# Patient Record
Sex: FEMALE | Race: WHITE | NOT HISPANIC OR LATINO | URBAN - NONMETROPOLITAN AREA
[De-identification: names, ages, dates, MRNs, and addresses within clinical notes are randomized per-mention and may not be internally consistent; named-entity substitution may affect disease eponyms.]

---

## 2018-09-11 PROBLEM — H25.813: Noted: 2018-09-11

## 2018-09-11 PROBLEM — H16.141: Noted: 2021-07-01

## 2018-09-11 PROBLEM — H16.141: Noted: 2021-08-21

## 2018-09-11 PROBLEM — H35.3131: Noted: 2021-07-01

## 2018-09-11 PROBLEM — H02.112: Noted: 2021-07-01

## 2018-09-11 PROBLEM — D31.32: Noted: 2021-07-01

## 2018-09-11 PROBLEM — H52.4: Noted: 2021-07-01

## 2018-09-11 PROBLEM — H02.112: Noted: 2021-08-21

## 2021-07-01 ENCOUNTER — IMPORTED ENCOUNTER (OUTPATIENT)
Dept: URBAN - NONMETROPOLITAN AREA CLINIC 1 | Facility: CLINIC | Age: 86
End: 2021-07-01

## 2021-07-01 PROCEDURE — 92134 CPTRZ OPH DX IMG PST SGM RTA: CPT

## 2021-07-01 PROCEDURE — 99204 OFFICE O/P NEW MOD 45 MIN: CPT

## 2021-07-01 PROCEDURE — 92015 DETERMINE REFRACTIVE STATE: CPT

## 2021-07-01 NOTE — PATIENT DISCUSSION
Cataract(s)-Visually significant.-Cataract(s) causing symptomatic impairment of visual function not correctable with a tolerable change in glasses or contact lenses lighting or non-operative means resulting in specific activity limitations and/or participation restrictions including but not limited to reading viewing television driving or meeting vocational or recreational needs. -Expectation is clearer vision and reduced glare disability after cataract removal.-Refer to Dr Jasmyne Lopez for cataract evaluations/p Chalazion RLL x2 excision 8/23/18 2' ectropion-RLL thickened with secondary ectropion; needs ocuplastics consult before considering cataract surgeryAMD OUBaseline MAC OCT today; monitor. Minimal risk for conversion to wet AMD.Presbyopia-Discussed diagnosis with patient. Updated spec Rx given but minimal improvement in vision; needs cat surgery

## 2021-08-21 ENCOUNTER — IMPORTED ENCOUNTER (OUTPATIENT)
Dept: URBAN - NONMETROPOLITAN AREA CLINIC 1 | Facility: CLINIC | Age: 86
End: 2021-08-21

## 2021-08-21 PROCEDURE — 92014 COMPRE OPH EXAM EST PT 1/>: CPT

## 2021-08-21 NOTE — PATIENT DISCUSSION
Cataract(s)-Visually significant cataract OD . -Cataract(s) causing symptomatic impairment of visual function not correctable with a tolerable change in glasses or contact lenses lighting or non-operative means resulting in specific activity limitations and/or participation restrictions including but not limited to reading viewing television driving or meeting vocational or recreational needs. -Expectation is clearer vision and functional improvement in symptoms as well as reduced glare disability after cataract removal.-Order IOLMaster and OPD today. -Recommend Standard Trad IOL OU  based on today's OPD testing and lifestyle questionnaire.-All questions were answered regarding surgery including pre and post-op medications appointments activity restrictions and anesthetic usage.-The risks benefits and alternatives and special risk factors for the patient were discussed in detail including but not limited to: bleeding infection retinal detachment vitreous loss problems with the implant and possible need for additional surgery.-Although rare the possibility of complete vision loss was discussed.-The possible need for glasses post-operatively was discussed.-Order medical clearance exam based on history of -Patient elects to proceed with cataract surgery OD . Will schedule at patient's convenience and re-evaluate OS  in the future. Pt wishes to proceed with STandard Trad IOL OD then OS Ectropion-Ectropion (the lower eyelid rolling outward causing lashes to rub against the eye) was explained to the patient. .-feels that it looks like a basil cell and needs to have a biopsy.-This can result in excessive tearing irritation infection scarring and loss of vision.-Treatment options include observation or surgical correction.-Risks and benefits of ectropion repair were discussed and pt elects to proceed.  Reccomend to send pt to [facility]. for treatment RLL

## 2021-08-23 ENCOUNTER — IMPORTED ENCOUNTER (OUTPATIENT)
Dept: URBAN - NONMETROPOLITAN AREA CLINIC 1 | Facility: CLINIC | Age: 86
End: 2021-08-23

## 2021-08-23 PROBLEM — Z01.818: Noted: 2021-08-23

## 2021-08-23 PROBLEM — I10: Noted: 2021-08-23

## 2021-08-23 PROBLEM — H02.112: Noted: 2021-08-23

## 2021-08-23 PROBLEM — H25.813: Noted: 2021-08-23

## 2021-08-23 PROBLEM — H16.141: Noted: 2021-08-23

## 2021-08-23 PROBLEM — D31.32: Noted: 2021-08-23

## 2021-08-31 ENCOUNTER — IMPORTED ENCOUNTER (OUTPATIENT)
Dept: URBAN - NONMETROPOLITAN AREA CLINIC 1 | Facility: CLINIC | Age: 86
End: 2021-08-31

## 2021-08-31 PROBLEM — Z96.1: Noted: 2021-08-31

## 2021-08-31 PROBLEM — H25.812: Noted: 2021-08-31

## 2021-08-31 PROBLEM — I10: Noted: 2021-08-23

## 2021-08-31 PROCEDURE — 99024 POSTOP FOLLOW-UP VISIT: CPT

## 2021-08-31 NOTE — PATIENT DISCUSSION
"""s/p PCIOL-Pt doing well s/p PCIOL. -Continue post-op gtts according to instruction sheet and sleep with eye shield over eye for 7 nights.-Avoid bending at the waist lifting anything over 5lbs and dirty or maco environments. -RTC 1 week POV 9/9/21Cataract OS-Keep appt for re-eval OS 9/9/21"

## 2021-09-09 ENCOUNTER — IMPORTED ENCOUNTER (OUTPATIENT)
Dept: URBAN - NONMETROPOLITAN AREA CLINIC 1 | Facility: CLINIC | Age: 86
End: 2021-09-09

## 2021-09-09 PROBLEM — Z96.1: Noted: 2021-09-09

## 2021-09-09 PROBLEM — Z98.42: Noted: 2021-09-14

## 2021-09-09 PROBLEM — H25.812: Noted: 2021-09-09

## 2021-09-09 PROBLEM — Z01.818: Noted: 2021-09-09

## 2021-09-09 PROBLEM — H35.3131: Noted: 2021-09-20

## 2021-09-09 PROBLEM — I10: Noted: 2021-09-09

## 2021-09-09 NOTE — PATIENT DISCUSSION
Cataract(s)-Visually significant cataract OS . -Cataract(s) causing symptomatic impairment of visual function not correctable with a tolerable change in glasses or contact lenses lighting or non-operative means resulting in specific activity limitations and/or participation restrictions including but not limited to reading viewing television driving or meeting vocational or recreational needs. -Expectation is clearer vision and functional improvement in symptoms as well as reduced glare disability after cataract removal.-Recommend Standard Trad IOL  based on previous OPD testing and lifestyle questionnaire.-All questions were answered regarding surgery including pre and post-op medications appointments activity restrictions and anesthetic usage.-The risks benefits and alternatives and special risk factors for the patient were discussed in detail including but not limited to: bleeding infection retinal detachment vitreous loss problems with the implant and possible need for additional surgery.-Although rare the possibility of complete vision loss was discussed.-The need for glasses post-operatively was discussed.-Patient elects to proceed with cataract surgery OS . Standard Trad IOL OSs/p PCIOL-Pt doing well at 1 week s/p PCIOL. -Continue post-op gtts according to instruction sheet.-Okay to resume usual activites and d/c eye shield.

## 2021-09-14 ENCOUNTER — IMPORTED ENCOUNTER (OUTPATIENT)
Dept: URBAN - NONMETROPOLITAN AREA CLINIC 1 | Facility: CLINIC | Age: 86
End: 2021-09-14

## 2021-09-14 PROBLEM — H43.813: Noted: 2021-10-05

## 2021-09-14 PROBLEM — Z96.1: Noted: 2021-10-05

## 2021-09-14 PROBLEM — H02.112: Noted: 2021-10-05

## 2021-09-14 PROBLEM — Z98.42: Noted: 2021-09-14

## 2021-09-14 PROBLEM — H35.3131: Noted: 2021-10-05

## 2021-09-14 PROCEDURE — 99024 POSTOP FOLLOW-UP VISIT: CPT

## 2021-09-14 NOTE — PATIENT DISCUSSION
s/p PCIOL-Pt doing well s/p PCIOL. -Continue post-op gtts according to instruction sheet and sleep with eye shield over eye for 7 nights.-Avoid bending at the waist lifting anything over 5lbs and dirty or maco environments. -START Alhaji QID OS -IOP 34 OS 2 gtt timoptic pf instilled in office OS

## 2021-09-20 ENCOUNTER — IMPORTED ENCOUNTER (OUTPATIENT)
Dept: URBAN - NONMETROPOLITAN AREA CLINIC 1 | Facility: CLINIC | Age: 86
End: 2021-09-20

## 2021-09-20 PROCEDURE — 99024 POSTOP FOLLOW-UP VISIT: CPT

## 2021-09-20 NOTE — PATIENT DISCUSSION
s/p PCIOL-Pt doing well at 1 week s/p PCIOL. -Continue post-op gtts according to instruction sheet.-Okay to resume usual activites and d/c eye shield. -Recheck in 2 weeks with refraction

## 2021-10-05 ENCOUNTER — IMPORTED ENCOUNTER (OUTPATIENT)
Dept: URBAN - NONMETROPOLITAN AREA CLINIC 1 | Facility: CLINIC | Age: 86
End: 2021-10-05

## 2021-10-05 PROCEDURE — 99024 POSTOP FOLLOW-UP VISIT: CPT

## 2021-10-05 NOTE — PATIENT DISCUSSION
s/p PCIOL-Stable PCIOL OU.-Monitor for PCO. -New Rx dispensed today Ectropion RLLwill refer to Dr. Colette Quarles due to transportation

## 2021-11-02 ENCOUNTER — IMPORTED ENCOUNTER (OUTPATIENT)
Dept: URBAN - NONMETROPOLITAN AREA CLINIC 1 | Facility: CLINIC | Age: 86
End: 2021-11-02

## 2021-11-02 PROBLEM — H02.112: Noted: 2021-10-05

## 2021-11-02 PROBLEM — H35.3131: Noted: 2021-11-02

## 2021-11-02 PROBLEM — H10.401: Noted: 2021-11-02

## 2021-11-02 PROBLEM — H43.813: Noted: 2021-10-05

## 2021-11-02 PROBLEM — Z96.1: Noted: 2021-11-02

## 2021-11-02 PROCEDURE — 92134 CPTRZ OPH DX IMG PST SGM RTA: CPT

## 2021-11-02 PROCEDURE — 99214 OFFICE O/P EST MOD 30 MIN: CPT

## 2021-11-02 NOTE — PATIENT DISCUSSION
Chronic conj ODSTART Tobradex QID OD X10 days until seen sent to [facility] will recheck in 10 days Ectropion RLL Keep sched appt w/Gwendolyn  (12/6/21)Suspect basal cell component given ulcerated appearanceAMD - dry-Explained dry AMD and advised that there are no treatments available at this time.-Continue AREDS 2 MVT. -MAC OCT ordered today no evidence of SRF/IRF at this time

## 2021-11-12 ENCOUNTER — IMPORTED ENCOUNTER (OUTPATIENT)
Dept: URBAN - NONMETROPOLITAN AREA CLINIC 1 | Facility: CLINIC | Age: 86
End: 2021-11-12

## 2021-11-12 PROCEDURE — 99213 OFFICE O/P EST LOW 20 MIN: CPT

## 2021-11-12 NOTE — PATIENT DISCUSSION
Chronic conj ODContinue Tobradex BID OD x 2 weeks then stop. Ectropion RLL Keep sched appt w/Gwendolyn  (12/6/21)Suspect basal cell component given ulcerated appearanceAMD - dry-Explained dry AMD and advised that there are no treatments available at this time.-Continue AREDS 2 MVT. PC IOL-Doing well monitorPVD OU-Chronic monitor.

## 2022-04-15 ASSESSMENT — VISUAL ACUITY
OS_CC: 20/200
OD_PH: 20/50
OD_CC: 20/60
OS_SC: 20/100
OS_GLARE: CF 2'
OS_PH: 20/60-1
OS_CC: 20/80
OD_SC: 20/60
OS_GLARE: CF 2'
OS_PH: 20/70+
OS_SC: 20/70+2
OD_GLARE: CF 2'
OD_PH: 20/60+3
OS_AM: 20/60
OD_PAM: 20/30
OD_CC: 20/70
OD_SC: 20/50-1
OS_CC: 20/200
OD_CC: 20/100
OS_PH: 20/80
OS_CC: 20/200
OD_CC: 20/60+2
OS_AM: 20/60
OD_PH: 20/40
OD_CC: 20/40
OS_SC: 20/100+2
OS_CC: 20/200
OD_PH: 20/60+2
OS_CC: 20/200
OD_SC: 20/40
OD_PH: 20/30-2
OD_CC: 20/60+2
OS_SC: 20/80-1

## 2022-04-15 ASSESSMENT — TONOMETRY
OS_IOP_MMHG: 19
OD_IOP_MMHG: 14
OD_IOP_MMHG: 18
OS_IOP_MMHG: 12
OS_IOP_MMHG: 19
OD_IOP_MMHG: 19
OD_IOP_MMHG: 31
OD_IOP_MMHG: 12
OD_IOP_MMHG: 13
OD_IOP_MMHG: 19
OS_IOP_MMHG: 14
OS_IOP_MMHG: 34
OS_IOP_MMHG: 19
OS_IOP_MMHG: 13
OD_IOP_MMHG: 12

## 2023-05-08 ENCOUNTER — FOLLOW UP (OUTPATIENT)
Dept: RURAL CLINIC 2 | Facility: CLINIC | Age: 88
End: 2023-05-08

## 2023-05-08 DIAGNOSIS — Z96.1: ICD-10-CM

## 2023-05-08 DIAGNOSIS — H10.401: ICD-10-CM

## 2023-05-08 DIAGNOSIS — Z98.890: ICD-10-CM

## 2023-05-08 DIAGNOSIS — H43.813: ICD-10-CM

## 2023-05-08 DIAGNOSIS — H26.493: ICD-10-CM

## 2023-05-08 DIAGNOSIS — H35.3133: ICD-10-CM

## 2023-05-08 PROCEDURE — 92134 CPTRZ OPH DX IMG PST SGM RTA: CPT

## 2023-05-08 PROCEDURE — 99214 OFFICE O/P EST MOD 30 MIN: CPT

## 2023-05-08 ASSESSMENT — VISUAL ACUITY
OS_CC: 20/200
OD_CC: 20/200

## 2023-06-14 ENCOUNTER — FOLLOW UP (OUTPATIENT)
Dept: RURAL CLINIC 2 | Facility: CLINIC | Age: 88
End: 2023-06-14

## 2023-06-14 DIAGNOSIS — Z96.1: ICD-10-CM

## 2023-06-14 DIAGNOSIS — H26.493: ICD-10-CM

## 2023-06-14 DIAGNOSIS — H35.3133: ICD-10-CM

## 2023-06-14 DIAGNOSIS — Z98.890: ICD-10-CM

## 2023-06-14 DIAGNOSIS — H43.813: ICD-10-CM

## 2023-06-14 DIAGNOSIS — H10.401: ICD-10-CM

## 2023-06-14 PROCEDURE — 99213 OFFICE O/P EST LOW 20 MIN: CPT

## 2023-06-14 ASSESSMENT — VISUAL ACUITY
OD_CC: 20/200
OS_CC: 20/200